# Patient Record
Sex: FEMALE
[De-identification: names, ages, dates, MRNs, and addresses within clinical notes are randomized per-mention and may not be internally consistent; named-entity substitution may affect disease eponyms.]

---

## 2022-12-11 ENCOUNTER — NON-APPOINTMENT (OUTPATIENT)
Age: 22
End: 2022-12-11

## 2023-09-08 ENCOUNTER — APPOINTMENT (OUTPATIENT)
Dept: ORTHOPEDIC SURGERY | Facility: CLINIC | Age: 23
End: 2023-09-08
Payer: COMMERCIAL

## 2023-09-08 VITALS — BODY MASS INDEX: 27.32 KG/M2 | HEIGHT: 66 IN | WEIGHT: 170 LBS

## 2023-09-08 DIAGNOSIS — M25.552 PAIN IN RIGHT HIP: ICD-10-CM

## 2023-09-08 DIAGNOSIS — M25.551 PAIN IN RIGHT HIP: ICD-10-CM

## 2023-09-08 PROBLEM — Z00.00 ENCOUNTER FOR PREVENTIVE HEALTH EXAMINATION: Status: ACTIVE | Noted: 2023-09-08

## 2023-09-08 PROCEDURE — 99203 OFFICE O/P NEW LOW 30 MIN: CPT

## 2023-09-08 NOTE — DISCUSSION/SUMMARY
[de-identified] : Patient I talked at length about the possible underlying etiology of her bilateral hip pain.  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  Patient will be sent for MRIs of both hips to help evaluate for possible acetabular labral tears we will discuss the findings and formulate a treatment plan once the MRIs are available for review.  Today's consultation lasted 40 minutes.

## 2023-09-08 NOTE — REASON FOR VISIT
[Initial Visit] : an initial visit for [Hip Pain] : hip pain [FreeTextEntry2] : KARL HIP PAIN. failed conservative treatment of 6 weeks of PT, OTC medication and prescrption SHE STATES THEY DONT HURT AT THE SAME TIME. HISTORY OF ATHRITIS AND HIP REPLACEMENT IN THE FAMILY FROM MOTHER.

## 2023-09-08 NOTE — HISTORY OF PRESENT ILLNESS
[de-identified] : First-time visit for this 23-year-old female she is here with a chronic history greater than 1-1/2-year of bilateral hip pain with intermittent snapping and popping sensations.  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  She does not recall any specific recent accident or injury.  She is concerned because her mother had bilateral hip replacements and would like to have her hips evaluated.  Patient brings outside radiographs which are available for review radiographs show AP and lateral both hips both hips shows shallow acetabular but no evidence of joint space narrowing.